# Patient Record
Sex: FEMALE | Race: WHITE | NOT HISPANIC OR LATINO | URBAN - METROPOLITAN AREA
[De-identification: names, ages, dates, MRNs, and addresses within clinical notes are randomized per-mention and may not be internally consistent; named-entity substitution may affect disease eponyms.]

---

## 2022-10-30 ENCOUNTER — OFFICE VISIT (OUTPATIENT)
Dept: URGENT CARE | Facility: CLINIC | Age: 62
End: 2022-10-30
Payer: COMMERCIAL

## 2022-10-30 VITALS
HEIGHT: 66 IN | WEIGHT: 146.4 LBS | HEART RATE: 61 BPM | RESPIRATION RATE: 16 BRPM | DIASTOLIC BLOOD PRESSURE: 70 MMHG | OXYGEN SATURATION: 97 % | BODY MASS INDEX: 23.53 KG/M2 | SYSTOLIC BLOOD PRESSURE: 122 MMHG | TEMPERATURE: 98.8 F

## 2022-10-30 DIAGNOSIS — L03.011 PARONYCHIA OF RIGHT MIDDLE FINGER: ICD-10-CM

## 2022-10-30 PROCEDURE — 99203 OFFICE O/P NEW LOW 30 MIN: CPT | Performed by: PHYSICIAN ASSISTANT

## 2022-10-30 RX ORDER — SULFAMETHOXAZOLE AND TRIMETHOPRIM 800; 160 MG/1; MG/1
1 TABLET ORAL 2 TIMES DAILY
Qty: 14 TABLET | Refills: 0 | Status: SHIPPED | OUTPATIENT
Start: 2022-10-30 | End: 2022-11-06

## 2022-10-30 ASSESSMENT — ENCOUNTER SYMPTOMS
NAUSEA: 0
HEADACHES: 0
EYE DISCHARGE: 0
FEVER: 0
COUGH: 0
VOMITING: 0
EYE REDNESS: 0

## 2022-10-30 NOTE — PROGRESS NOTES
"Subjective     Angie Patterson is a 62 y.o. female who presents with Finger Pain (Right middle finger infection )            HPI    This is a new problem.  The patient presents to clinic complaining of pain to her right third digit x 2-3 days ago.  The patient reports associated pain, swelling, and redness to the distal aspect of her right third digit.  The patient reports no recent injury or trauma to her right third digit.  She also reports no discharge/drainage from the nail fold of the right third digit.  No fever.  No decreased range of motion.  The patient has been soaking her finger in Epson salts, and applying antibacterial ointment.  The patient reports no improvement of her symptoms despite these treatments.    PMH:  has no past medical history on file.  MEDS: No current outpatient medications on file.  ALLERGIES: Not on File  SURGHX: No past surgical history on file.  SOCHX:    FH: Family history was reviewed, no pertinent findings to report      Review of Systems   Constitutional:  Negative for fever.   HENT:  Negative for congestion.    Eyes:  Negative for discharge and redness.   Respiratory:  Negative for cough.    Gastrointestinal:  Negative for nausea and vomiting.   Skin:         + Paronychia of right third digit   Neurological:  Negative for headaches.            Objective     /70 (BP Location: Left arm, Patient Position: Sitting, BP Cuff Size: Adult)   Pulse 61   Temp 37.1 °C (98.8 °F) (Temporal)   Resp 16   Ht 1.676 m (5' 6\")   Wt 66.4 kg (146 lb 6.4 oz)   SpO2 97%   BMI 23.63 kg/m²      Physical Exam  Constitutional:       General: She is not in acute distress.     Appearance: Normal appearance. She is well-developed. She is not ill-appearing.   HENT:      Head: Normocephalic and atraumatic.      Right Ear: External ear normal.      Left Ear: External ear normal.      Nose: Nose normal.   Eyes:      Extraocular Movements: Extraocular movements intact.      Conjunctiva/sclera: " Conjunctivae normal.   Cardiovascular:      Rate and Rhythm: Normal rate.   Pulmonary:      Effort: Pulmonary effort is normal.   Musculoskeletal:         General: Normal range of motion.      Cervical back: Normal range of motion and neck supple.   Skin:     General: Skin is warm and dry.      Comments:   Right Third Digit:  Localized tenderness to the distal aspect of the right third digit involving the nail fold with localized edema and overlying erythema.  No increased warmth.  No palpable induration.  No palpable fluctuance.  No active discharge/drainage.  No signs of lymphangitis.   Neurological:      Mental Status: She is alert and oriented to person, place, and time.                Progress:   The patient declined I&D of her paronychia today in clinic.           Assessment & Plan          1. Paronychia of right middle finger  - sulfamethoxazole-trimethoprim (BACTRIM DS) 800-160 MG tablet; Take 1 Tablet by mouth 2 times a day for 7 days.  Dispense: 14 Tablet; Refill: 0    Differential diagnoses, supportive care, and indications for immediate follow-up discussed with patient.   Instructed to return to clinic or nearest emergency department for any change in condition, further concerns, or worsening of symptoms.    OTC Tylenol or Motrin for fever/discomfort.  Epson salt soaks  Monitor for worsening signs or symptoms  Follow-up with PCP  Return to clinic or go to the ED if symptoms worsen or fail to improve, or if the patient should develop worsening/increasing/persistent pain/tenderness, swelling, increased redness or warmth, discharge/drainage, fever/chills, secondary signs of infection, and/or any concerning symptoms.    Discussed plan with the patient, and she agrees to the above.     I personally reviewed prior external notes and test results pertinent to today's visit.  I have independently reviewed and interpreted all diagnostics ordered during this urgent care visit.     Please note that this dictation  was created using voice recognition software. I have made every reasonable attempt to correct obvious errors, but I expect that there may be errors of grammar and possibly content that I did not discover before finalizing the note.     This note was electronically signed by Isela Aiken PA-C

## 2022-11-01 ENCOUNTER — OFFICE VISIT (OUTPATIENT)
Dept: URGENT CARE | Facility: CLINIC | Age: 62
End: 2022-11-01
Payer: COMMERCIAL

## 2022-11-01 VITALS
OXYGEN SATURATION: 97 % | DIASTOLIC BLOOD PRESSURE: 80 MMHG | HEART RATE: 52 BPM | WEIGHT: 146 LBS | RESPIRATION RATE: 16 BRPM | TEMPERATURE: 98.2 F | SYSTOLIC BLOOD PRESSURE: 110 MMHG | HEIGHT: 66 IN | BODY MASS INDEX: 23.46 KG/M2

## 2022-11-01 DIAGNOSIS — L03.011 PARONYCHIA OF RIGHT MIDDLE FINGER: ICD-10-CM

## 2022-11-01 PROCEDURE — 10060 I&D ABSCESS SIMPLE/SINGLE: CPT | Performed by: EMERGENCY MEDICINE

## 2022-11-02 ASSESSMENT — ENCOUNTER SYMPTOMS: FEVER: 0

## 2022-11-02 NOTE — PROGRESS NOTES
"  Subjective:     Angie Patterson  is a 62 y.o. female who presents for Finger Pain (X Rt. Middle finger pain, redness and swelling around nail, white dot.  Currently taking bactrim, patient was seen on 10/30/22.)       Patient presents for repeat visit, for paronychia of her right (dominant) middle finger.  She was seen 2 days ago and prescribed Bactrim at that time, she noted some improvement, but now notes that the area on the radial aspect of the nail has developed a pointed area, become increased red and painful, and there is now erythema at the base of the nail that was not there previously.  It has not moved proximally, there is been no drainage from the wound.  She states it is very tender to the touch.  She has been compliant with the Bactrim.  She has no history of the same in the past.  She says she does use her hands a lot with cleaning.   Review of Systems   Constitutional:  Negative for fever.   Skin:         Erythema of affected right mid finger tip   All other systems reviewed and are negative. No Known Allergies  History reviewed. No pertinent past medical history.     Objective:   /80 (BP Location: Left arm, Patient Position: Sitting, BP Cuff Size: Adult)   Pulse (!) 52   Temp 36.8 °C (98.2 °F) (Temporal)   Resp 16   Ht 1.676 m (5' 6\")   Wt 66.2 kg (146 lb)   SpO2 97%   BMI 23.57 kg/m²   Physical Exam  Vitals and nursing note reviewed.   Constitutional:       Appearance: Normal appearance.   HENT:      Head: Normocephalic and atraumatic.   Eyes:      General: No scleral icterus.        Right eye: No discharge.         Left eye: No discharge.   Pulmonary:      Effort: Pulmonary effort is normal. No respiratory distress.   Musculoskeletal:      Right hand: Swelling and tenderness present.        Hands:       Cervical back: Normal range of motion and neck supple.      Right lower leg: No edema.      Left lower leg: No edema.      Comments: Erythema, pointing on the radial aspect adjacent " to the nail, erythema at the base of the nail as well.  No felon, no undue tenderness of the pad of the finger.  Normal range of motion at the DIP joint, no proximal erythema or streaking.   Skin:     General: Skin is warm and dry.      Coloration: Skin is not jaundiced.      Findings: No rash.   Neurological:      General: No focal deficit present.      Mental Status: She is alert.   Psychiatric:         Mood and Affect: Mood normal.         Behavior: Behavior normal.         Thought Content: Thought content normal.         Assessment/Plan:     Encounter Diagnoses   Name Primary?    Paronychia of right middle finger        Patient on antibiotics, but worsening paronychia, will perform I&D.  See procedure note.  Patient will continue her antibiotics, continue wound care, return precautions were discussed with the patient.    Assessment    1. Paronychia of right middle finger  - Paronychia I&D    See AVS Instructions below for written guidance provided to patient on after-visit management and care in addition to our verbal discussion during the visit.    Please note that this dictation was created using voice recognition software. I have attempted to correct all errors, but there may be sound-alike, spelling, grammar and possibly content errors that I did not discover before finalizing the note.

## 2022-11-02 NOTE — PROCEDURES
Paronychia I&D    Date/Time: 11/1/2022 6:54 PM  Performed by: She Pierson M.D.  Authorized by: She Pierson M.D.   Type: abscess  Body area: upper extremity  Location details: right second finger  Anesthesia: digital block    Anesthesia:  Local Anesthetic: lidocaine 1% without epinephrine  Anesthetic total: 5 mL    Sedation:  Patient sedated: no    Scalpel size: 11  Incision type: single straight  Complexity: simple  Drainage: serosanguinous  Drainage amount: moderate  Wound treatment: wound left open  Packing material: none  Patient tolerance: patient tolerated the procedure well with no immediate complications

## 2022-11-02 NOTE — PATIENT INSTRUCTIONS
Continue your antibiotics until completed, do wound care as noted on follow-up instructions, return if the redness or swelling recurs.

## 2023-11-20 ENCOUNTER — APPOINTMENT (OUTPATIENT)
Dept: URGENT CARE | Facility: CLINIC | Age: 63
End: 2023-11-20
Payer: COMMERCIAL

## 2025-08-12 ENCOUNTER — OFFICE VISIT (OUTPATIENT)
Dept: URGENT CARE | Facility: CLINIC | Age: 65
End: 2025-08-12
Payer: MEDICARE

## 2025-08-12 ENCOUNTER — HOSPITAL ENCOUNTER (OUTPATIENT)
Facility: MEDICAL CENTER | Age: 65
End: 2025-08-12
Payer: MEDICARE

## 2025-08-12 VITALS
DIASTOLIC BLOOD PRESSURE: 80 MMHG | RESPIRATION RATE: 16 BRPM | OXYGEN SATURATION: 96 % | SYSTOLIC BLOOD PRESSURE: 122 MMHG | TEMPERATURE: 97.2 F | HEART RATE: 60 BPM | BODY MASS INDEX: 24.11 KG/M2 | HEIGHT: 66 IN | WEIGHT: 150 LBS

## 2025-08-12 DIAGNOSIS — N30.01 ACUTE CYSTITIS WITH HEMATURIA: ICD-10-CM

## 2025-08-12 DIAGNOSIS — R30.0 DYSURIA: Primary | ICD-10-CM

## 2025-08-12 LAB
APPEARANCE UR: CLEAR
BILIRUB UR STRIP-MCNC: NEGATIVE MG/DL
COLOR UR AUTO: YELLOW
GLUCOSE UR STRIP.AUTO-MCNC: NEGATIVE MG/DL
KETONES UR STRIP.AUTO-MCNC: NEGATIVE MG/DL
LEUKOCYTE ESTERASE UR QL STRIP.AUTO: NORMAL
NITRITE UR QL STRIP.AUTO: NEGATIVE
PH UR STRIP.AUTO: 5.5 [PH] (ref 5–8)
PROT UR QL STRIP: NEGATIVE MG/DL
RBC UR QL AUTO: NORMAL
SP GR UR STRIP.AUTO: 1
UROBILINOGEN UR STRIP-MCNC: 0.2 MG/DL

## 2025-08-12 PROCEDURE — 87077 CULTURE AEROBIC IDENTIFY: CPT

## 2025-08-12 PROCEDURE — 87086 URINE CULTURE/COLONY COUNT: CPT

## 2025-08-12 PROCEDURE — 81002 URINALYSIS NONAUTO W/O SCOPE: CPT

## 2025-08-12 PROCEDURE — 99214 OFFICE O/P EST MOD 30 MIN: CPT

## 2025-08-12 PROCEDURE — 3074F SYST BP LT 130 MM HG: CPT

## 2025-08-12 PROCEDURE — 3079F DIAST BP 80-89 MM HG: CPT

## 2025-08-12 PROCEDURE — 87186 SC STD MICRODIL/AGAR DIL: CPT

## 2025-08-12 RX ORDER — SULFAMETHOXAZOLE AND TRIMETHOPRIM 800; 160 MG/1; MG/1
1 TABLET ORAL 2 TIMES DAILY
Qty: 20 TABLET | Refills: 0 | Status: SHIPPED | OUTPATIENT
Start: 2025-08-12 | End: 2025-08-22

## 2025-08-12 ASSESSMENT — ENCOUNTER SYMPTOMS
CONSTIPATION: 0
EYE DISCHARGE: 0
FLANK PAIN: 1
EMPTYING BLADDER: 1
EYE REDNESS: 0
VOMITING: 0
FEVER: 0
BLOOD IN STOOL: 0
DIARRHEA: 0
WHEEZING: 0
COUGH: 0
BRUISES/BLEEDS EASILY: 0

## 2025-08-15 LAB
BACTERIA UR CULT: ABNORMAL
BACTERIA UR CULT: ABNORMAL
SIGNIFICANT IND 70042: ABNORMAL
SITE SITE: ABNORMAL
SOURCE SOURCE: ABNORMAL

## 2025-08-19 ENCOUNTER — APPOINTMENT (OUTPATIENT)
Dept: URBAN - METROPOLITAN AREA CLINIC 4 | Facility: CLINIC | Age: 65
Setting detail: DERMATOLOGY
End: 2025-08-19

## 2025-08-19 DIAGNOSIS — D22 MELANOCYTIC NEVI: ICD-10-CM

## 2025-08-19 DIAGNOSIS — L29.89 OTHER PRURITUS: ICD-10-CM

## 2025-08-19 DIAGNOSIS — L81.4 OTHER MELANIN HYPERPIGMENTATION: ICD-10-CM

## 2025-08-19 DIAGNOSIS — L82.1 OTHER SEBORRHEIC KERATOSIS: ICD-10-CM

## 2025-08-19 DIAGNOSIS — L57.0 ACTINIC KERATOSIS: ICD-10-CM

## 2025-08-19 PROBLEM — D22.39 MELANOCYTIC NEVI OF OTHER PARTS OF FACE: Status: ACTIVE | Noted: 2025-08-19

## 2025-08-19 PROCEDURE — ? LIQUID NITROGEN

## 2025-08-19 PROCEDURE — ? ADDITIONAL NOTES

## 2025-08-19 PROCEDURE — ? COUNSELING

## 2025-08-19 ASSESSMENT — LOCATION SIMPLE DESCRIPTION DERM
LOCATION SIMPLE: LEFT FOREHEAD
LOCATION SIMPLE: RIGHT FOREHEAD
LOCATION SIMPLE: RIGHT UPPER ARM
LOCATION SIMPLE: LEFT EYEBROW
LOCATION SIMPLE: RIGHT CHEEK
LOCATION SIMPLE: LEFT CHEEK

## 2025-08-19 ASSESSMENT — LOCATION DETAILED DESCRIPTION DERM
LOCATION DETAILED: LEFT CENTRAL BUCCAL CHEEK
LOCATION DETAILED: LEFT MEDIAL FOREHEAD
LOCATION DETAILED: LEFT CENTRAL EYEBROW
LOCATION DETAILED: RIGHT LATERAL PROXIMAL UPPER ARM
LOCATION DETAILED: RIGHT SUPERIOR MEDIAL FOREHEAD
LOCATION DETAILED: LEFT INFERIOR CENTRAL MALAR CHEEK
LOCATION DETAILED: RIGHT INFERIOR CENTRAL MALAR CHEEK
LOCATION DETAILED: LEFT INFERIOR LATERAL FOREHEAD

## 2025-08-19 ASSESSMENT — LOCATION ZONE DERM
LOCATION ZONE: FACE
LOCATION ZONE: ARM